# Patient Record
Sex: MALE | Race: WHITE | NOT HISPANIC OR LATINO | URBAN - METROPOLITAN AREA
[De-identification: names, ages, dates, MRNs, and addresses within clinical notes are randomized per-mention and may not be internally consistent; named-entity substitution may affect disease eponyms.]

---

## 2023-05-11 NOTE — PROGRESS NOTES
FAMILY MEDICINE  [facility]    Reason for visit:   Chief Complaint   Patient presents with    GI Problem    Genital Warts         SUBJECTIVE: Max Mejia is a 27 y.o. male  - presents as a new patient would like to discuss abdominal issues and concerns for wart    Max Mejia reports the last several months he has been dealing with abdominal cramping and bloating.  He reports that he has some issues with constipation and diarrhea recently.  He admits to not having good diet however he reports over the last year he has been making some changes that is cooking more at home and not eating out as much.  He does report cramping pain at his rectum as well has noticed blood in his stools.  He reports the blood seems to be strings of blood that are intermixed with his stool.  He does have some occasional blood when he wipes.  He is not had any issues previously.  He denies any family history of irritable bowel disease or colon cancer.    He also has concerns for work on his [place] area.  He reports it presented itself 2 weeks ago.  He reports it is nonpainful.  He denies any drainage swelling redness.  Denies any prior issues with genital warts.  We do have records of an HPV vaccine in 2015.  However we only have records of a single dose.  He thinks that he did complete his series but is unsure.  He will check his records        Review of Systems   All other systems reviewed and are negative.    HEALTH MAINTENANCE:   Health Maintenance   Topic Date Due    Hepatitis C Screening  Never done    Lipid Panel  Never done    HPV Vaccines (2 - Male 3-dose series) 11/13/2015    TETANUS VACCINE  10/21/2025     Health Maintenance Topics with due status: Not Due       Topic Last Completion Date    TETANUS VACCINE 10/21/2015    Influenza Vaccine 10/01/2018     Health Maintenance Due   Topic Date Due    Hepatitis C Screening  Never done    Lipid Panel  Never done    HIV Screening  Never done    HPV  "Vaccines (2 - Male 3-dose series) 11/13/2015    COVID-19 Vaccine (3 - Booster for Pfizer series) 05/17/2021       HISTORY:   Past Medical History:   Diagnosis Date    Nasal fracture        History reviewed. No pertinent surgical history.    Family History   Problem Relation Age of Onset    No Known Problems Mother     Diabetes Father     No Known Problems Sister     No Known Problems Sister     No Known Problems Maternal Grandmother     Cancer Maternal Grandfather         unknown    No Known Problems Paternal Grandmother     Liver cancer Paternal Grandfather        Social History     Tobacco Use    Smoking status: Every Day     Types: Vaping with nicotine, Cigarettes     Passive exposure: Current    Smokeless tobacco: Never   Substance Use Topics    Alcohol use: Yes     Comment: rare    Drug use: Yes     Types: Marijuana       Social History     Social History Narrative    Single in long-term relationship with female partner. From [place]. Served in the [id number]. . Cigarette, marijuana and vaping. Planning to stop smoking and vaping       ALLERGIES:   Review of patient's allergies indicates:   Allergen Reactions    Cat dander        MEDS:   No current outpatient medications on file as of 5/15/2023.     No current facility-administered medications on file as of 5/15/2023.       Vital signs:   Vitals:    05/15/23 1349   BP: 122/82   Pulse: 97   SpO2: 99%   Weight: 81.3 kg (179 lb 2 oz)   Height: 5' 8" (1.727 m)     Body mass index is 27.24 kg/m².    PHYSICAL EXAM:     Physical Exam  Vitals reviewed.   Constitutional:       General: He is not in acute distress.     Appearance: Normal appearance.   HENT:      Head: Normocephalic and atraumatic.      Right Ear: Tympanic membrane and ear canal normal.      Left Ear: Tympanic membrane and ear canal normal.      Nose: Nose normal.      Mouth/Throat:      Pharynx: Uvula midline.   Eyes:      General: No scleral icterus.     Conjunctiva/sclera: Conjunctivae normal. " "  Neck:      Thyroid: No thyromegaly.      Vascular: Normal carotid pulses. No carotid bruit or JVD.      Trachea: Trachea normal.   Cardiovascular:      Rate and Rhythm: Normal rate and regular rhythm.      Pulses: Normal pulses.      Heart sounds: Normal heart sounds. No murmur heard.    No friction rub. No gallop.   Pulmonary:      Effort: Pulmonary effort is normal.      Breath sounds: Normal breath sounds. No decreased breath sounds, wheezing, rhonchi or rales.   Abdominal:      General: Bowel sounds are normal. There is no distension.      Palpations: Abdomen is soft. There is no hepatomegaly or mass.      Tenderness: There is no abdominal tenderness. There is no guarding or rebound.   Musculoskeletal:      Cervical back: Neck supple.      Right lower leg: No edema.      Left lower leg: No edema.   Lymphadenopathy:      Cervical: No cervical adenopathy.   Skin:     General: Skin is warm.      Capillary Refill: Capillary refill takes less than 2 seconds.      Nails: There is no clubbing.          Neurological:      Mental Status: He is alert and oriented to person, place, and time.           PERTINENT RESULTS:   No visits with results within 1 Week(s) from this visit.   Latest known visit with results is:   Historical on 12/10/2008   Component Date Value Ref Range Status    Prothrombin Time 12/10/2008 10.4  9.0 - 12.5 sec Final    INR 12/10/2008 1.0  0.8 - 1.2 Final    Comment: ACCP Guideline for Coumadin usage recommends a "target range" of  2.0 - 3.0 for INR for all indicators except mechanical heart valves  and antiphospholipid syndromes which should use 2.5 - 3.5.  .      aPTT 12/10/2008 26.2  21.0 - 32.0 sec Final    Therapeutic range: 59-93 sec.    Color, UA 12/10/2008 Yellow  Yellow Final    Comment: If formed elements are not present in the microscopic  examination, they are NOT mentioned in the report.      Appearance, UA 12/10/2008 Clear  Clear Final    Specific Gravity, UA 12/10/2008 1.020  1.005 - " 1.030 Final    Leukocytes, UA 12/10/2008 NEG  Negative Final    Nitrite, UA 12/10/2008 NEG  Negative Final    pH, UA 12/10/2008 6.0  4.5 - 8.0 Final    Protein, UA 12/10/2008 NEG  Negative mg/dl Final    Glucose, UA 12/10/2008 NORM  Negative mg/dl Final    Ketones, UA 12/10/2008 NEG  Negative mg/dl Final    Bilirubin (UA) 12/10/2008 NEG  Negative Final    Urobilinogen, urine 12/10/2008 Normal  <4 mg/dL Final    Occult Blood UA 12/10/2008 NEG  Negative Final    WBC 12/10/2008 8.7  4.5 - 13.5 K/uL Final    RBC 12/10/2008 4.68  4.0 - 5.2 M/uL Final    Hemoglobin 12/10/2008 13.5  11.5 - 15.5 gm/dl Final    Hematocrit 12/10/2008 39.0  35.0 - 45.0 % Final    MCV 12/10/2008 83.4  77 - 95 fL Final    MCH 12/10/2008 28.9  25 - 33 pg Final    MCHC 12/10/2008 34.6  31 - 36 % Final    RDW 12/10/2008 12.9  11.5 - 14.5 % Final    Gran % 12/10/2008 45.8  33 - 59 % Final    Lymph % 12/10/2008 46.0  33 - 50 % Final    Mono % 12/10/2008 4.5  0.0 - 5.9 % Final    Eosinophil % 12/10/2008 3.3  0.0 - 3.7 % Final    Basophil % 12/10/2008 0.4  0 - 1.5 % Final    Gran # (ANC) 12/10/2008 4.0  1.5 - 8.0 K/uL Final    Lymph # 12/10/2008 4.0  1.5 - 6.8 K/uL Final    Mono # 12/10/2008 0.4  0.0 - 0.8 K/uL Final    Eos # 12/10/2008 0.3  0 - 0.5 K/uL Final    Baso # 12/10/2008 0.0  0.0 - 0.2 K/uL Final    Platelets 12/10/2008 272  150 - 350 K/uL Final    MPV 12/10/2008 8.6 (L)  9.2 - 12.9 fL Final     No results found for: CHOL  No results found for: HDL  No results found for: LDLCALC  No results found for: DLDL  No results found for: TRIG    f1 No results found for: CHOLHDL  No results found for: HIV1X2, IRQ81XVQZ  No results found for: HAV, HEPAIGM, HEPBIGM, HEPBCAB, HBEAG, HEPCAB    ASSESSMENT/PLAN:    1. Genital warts  Overview:  - recommend Dermatology evaluation for removal  - recommend complete HPV series if have not done previously. He reports that is suspects that he completed the series    Orders:  -     Ambulatory referral/consult to  Dermatology; Future; Expected date: 05/22/2023    2. Generalized abdominal pain  Overview:  - discussed concern for IBS vs IBD  - discussed concern for BRBPR  - recommend GI evaluation      3. Bright red blood per rectum  Overview:  - hemorrhoid vs diverticuli vs fissures vs IBD  - recommend GI evaluation    Orders:  -     Ambulatory referral/consult to Gastroenterology; Future; Expected date: 05/22/2023    4. Screening for metabolic disorder  -     Comprehensive Metabolic Panel; Future; Expected date: 05/15/2023    5. Encounter for lipid screening for cardiovascular disease  -     Lipid Panel; Future; Expected date: 05/15/2023    6. Screening, iron deficiency anemia  -     CBC Without Differential; Future; Expected date: 05/15/2023    7. Screening for diabetes mellitus (DM)  -     Hemoglobin A1C; Future; Expected date: 05/15/2023    8. Screening for thyroid disorder  -     TSH; Future; Expected date: 05/15/2023    9. Screen for STD (sexually transmitted disease)  -     C. trachomatis/N. gonorrhoeae by AMP DNA [facility]; Urine; Future; Expected date: 05/15/2023    10. Need for hepatitis C screening test  -     Hepatitis C Antibody; Future; Expected date: 05/15/2023    11. Screening for HIV (human immunodeficiency virus)  -     HIV 1/2 Ag/Ab (4th Gen); Future; Expected date: 05/15/2023    12. Need for vaccination against Streptococcus pneumoniae  -     Pneumococcal Conjugate Vaccine (20 Valent) (IM)          ORDERS:   Orders Placed This Encounter    C. trachomatis/N. gonorrhoeae by AMP DNA [facility]; Urine    Pneumococcal Conjugate Vaccine (20 Valent) (IM)    Comprehensive Metabolic Panel    CBC Without Differential    Hemoglobin A1C    Hepatitis C Antibody    HIV 1/2 Ag/Ab (4th Gen)    Lipid Panel    TSH    Ambulatory referral/consult to Dermatology    Ambulatory referral/consult to Gastroenterology       Vaccines recommended: covid1- booster, PCV 20    Follow-up in 1 year pending results or sooner if any  concerns.      This note is dictated using the M*Modal Fluency Direct word recognition program. There are word recognition mistakes that are occasionally missed on review.    Dr. Rosario Mock D.O.   [facility]

## 2023-05-15 ENCOUNTER — OFFICE VISIT (OUTPATIENT)
Dept: PRIMARY CARE CLINIC | Facility: CLINIC | Age: 28
End: 2023-05-15
Attending: FAMILY MEDICINE
Payer: COMMERCIAL

## 2023-05-15 VITALS
BODY MASS INDEX: 27.15 KG/M2 | HEIGHT: 68 IN | OXYGEN SATURATION: 99 % | HEART RATE: 97 BPM | SYSTOLIC BLOOD PRESSURE: 122 MMHG | WEIGHT: 179.13 LBS | DIASTOLIC BLOOD PRESSURE: 82 MMHG

## 2023-05-15 DIAGNOSIS — Z13.0 SCREENING, IRON DEFICIENCY ANEMIA: ICD-10-CM

## 2023-05-15 DIAGNOSIS — Z13.29 SCREENING FOR THYROID DISORDER: ICD-10-CM

## 2023-05-15 DIAGNOSIS — Z11.4 SCREENING FOR HIV (HUMAN IMMUNODEFICIENCY VIRUS): ICD-10-CM

## 2023-05-15 DIAGNOSIS — Z11.3 SCREEN FOR STD (SEXUALLY TRANSMITTED DISEASE): ICD-10-CM

## 2023-05-15 DIAGNOSIS — R10.84 GENERALIZED ABDOMINAL PAIN: ICD-10-CM

## 2023-05-15 DIAGNOSIS — Z23 NEED FOR VACCINATION AGAINST STREPTOCOCCUS PNEUMONIAE: ICD-10-CM

## 2023-05-15 DIAGNOSIS — Z13.228 SCREENING FOR METABOLIC DISORDER: ICD-10-CM

## 2023-05-15 DIAGNOSIS — A63.0 GENITAL WARTS: Primary | ICD-10-CM

## 2023-05-15 DIAGNOSIS — Z11.59 NEED FOR HEPATITIS C SCREENING TEST: ICD-10-CM

## 2023-05-15 DIAGNOSIS — Z13.220 ENCOUNTER FOR LIPID SCREENING FOR CARDIOVASCULAR DISEASE: ICD-10-CM

## 2023-05-15 DIAGNOSIS — Z13.1 SCREENING FOR DIABETES MELLITUS (DM): ICD-10-CM

## 2023-05-15 DIAGNOSIS — Z13.6 ENCOUNTER FOR LIPID SCREENING FOR CARDIOVASCULAR DISEASE: ICD-10-CM

## 2023-05-15 DIAGNOSIS — K62.5 BRIGHT RED BLOOD PER RECTUM: ICD-10-CM

## 2023-05-15 PROCEDURE — 1159F PR MEDICATION LIST DOCUMENTED IN MEDICAL RECORD: ICD-10-PCS | Mod: CPTII,S$GLB,, | Performed by: FAMILY MEDICINE

## 2023-05-15 PROCEDURE — 3008F PR BODY MASS INDEX (BMI) DOCUMENTED: ICD-10-PCS | Mod: CPTII,S$GLB,, | Performed by: FAMILY MEDICINE

## 2023-05-15 PROCEDURE — 90677 PNEUMOCOCCAL CONJUGATE VACCINE 20-VALENT: ICD-10-PCS | Mod: S$GLB,,, | Performed by: FAMILY MEDICINE

## 2023-05-15 PROCEDURE — 99999 PR PBB SHADOW E&M-NEW PATIENT-LVL IV: ICD-10-PCS | Mod: PBBFAC,,, | Performed by: FAMILY MEDICINE

## 2023-05-15 PROCEDURE — 90677 PCV20 VACCINE IM: CPT | Mod: S$GLB,,, | Performed by: FAMILY MEDICINE

## 2023-05-15 PROCEDURE — 99999 PR PBB SHADOW E&M-NEW PATIENT-LVL IV: CPT | Mod: PBBFAC,,, | Performed by: FAMILY MEDICINE

## 2023-05-15 PROCEDURE — 3074F PR MOST RECENT SYSTOLIC BLOOD PRESSURE < 130 MM HG: ICD-10-PCS | Mod: CPTII,S$GLB,, | Performed by: FAMILY MEDICINE

## 2023-05-15 PROCEDURE — 3079F PR MOST RECENT DIASTOLIC BLOOD PRESSURE 80-89 MM HG: ICD-10-PCS | Mod: CPTII,S$GLB,, | Performed by: FAMILY MEDICINE

## 2023-05-15 PROCEDURE — 1159F MED LIST DOCD IN RCRD: CPT | Mod: CPTII,S$GLB,, | Performed by: FAMILY MEDICINE

## 2023-05-15 PROCEDURE — 90471 IMMUNIZATION ADMIN: CPT | Mod: S$GLB,,, | Performed by: FAMILY MEDICINE

## 2023-05-15 PROCEDURE — 90471 PNEUMOCOCCAL CONJUGATE VACCINE 20-VALENT: ICD-10-PCS | Mod: S$GLB,,, | Performed by: FAMILY MEDICINE

## 2023-05-15 PROCEDURE — 99204 PR OFFICE/OUTPT VISIT, NEW, LEVL IV, 45-59 MIN: ICD-10-PCS | Mod: 25,S$GLB,, | Performed by: FAMILY MEDICINE

## 2023-05-15 PROCEDURE — 3074F SYST BP LT 130 MM HG: CPT | Mod: CPTII,S$GLB,, | Performed by: FAMILY MEDICINE

## 2023-05-15 PROCEDURE — 3079F DIAST BP 80-89 MM HG: CPT | Mod: CPTII,S$GLB,, | Performed by: FAMILY MEDICINE

## 2023-05-15 PROCEDURE — 3008F BODY MASS INDEX DOCD: CPT | Mod: CPTII,S$GLB,, | Performed by: FAMILY MEDICINE

## 2023-05-15 PROCEDURE — 99204 OFFICE O/P NEW MOD 45 MIN: CPT | Mod: 25,S$GLB,, | Performed by: FAMILY MEDICINE

## 2023-05-15 NOTE — PATIENT INSTRUCTIONS
Check if you have completed your HPV vaccine series. Our records show that you got at least 1 dose and there are 3 doses today.

## 2023-05-19 ENCOUNTER — LAB VISIT (OUTPATIENT)
Dept: LAB | Facility: HOSPITAL | Age: 28
End: 2023-05-19
Attending: FAMILY MEDICINE
Payer: COMMERCIAL

## 2023-05-19 DIAGNOSIS — Z13.0 SCREENING, IRON DEFICIENCY ANEMIA: ICD-10-CM

## 2023-05-19 DIAGNOSIS — Z11.4 SCREENING FOR HIV (HUMAN IMMUNODEFICIENCY VIRUS): ICD-10-CM

## 2023-05-19 DIAGNOSIS — Z13.29 SCREENING FOR THYROID DISORDER: ICD-10-CM

## 2023-05-19 DIAGNOSIS — Z13.220 ENCOUNTER FOR LIPID SCREENING FOR CARDIOVASCULAR DISEASE: ICD-10-CM

## 2023-05-19 DIAGNOSIS — Z13.228 SCREENING FOR METABOLIC DISORDER: ICD-10-CM

## 2023-05-19 DIAGNOSIS — Z13.1 SCREENING FOR DIABETES MELLITUS (DM): ICD-10-CM

## 2023-05-19 DIAGNOSIS — Z13.6 ENCOUNTER FOR LIPID SCREENING FOR CARDIOVASCULAR DISEASE: ICD-10-CM

## 2023-05-19 DIAGNOSIS — Z11.59 NEED FOR HEPATITIS C SCREENING TEST: ICD-10-CM

## 2023-05-19 LAB
ALBUMIN SERPL BCP-MCNC: 4.5 G/DL (ref 3.5–5.2)
ALP SERPL-CCNC: 58 U/L (ref 55–135)
ALT SERPL W/O P-5'-P-CCNC: 17 U/L (ref 10–44)
ANION GAP SERPL CALC-SCNC: 11 MMOL/L (ref 8–16)
AST SERPL-CCNC: 18 U/L (ref 10–40)
BILIRUB SERPL-MCNC: 0.4 MG/DL (ref 0.1–1)
BUN SERPL-MCNC: 15 MG/DL (ref 6–20)
CALCIUM SERPL-MCNC: 9.9 MG/DL (ref 8.7–10.5)
CHLORIDE SERPL-SCNC: 105 MMOL/L (ref 95–110)
CHOLEST SERPL-MCNC: 186 MG/DL (ref 120–199)
CHOLEST/HDLC SERPL: 4.4 {RATIO} (ref 2–5)
CO2 SERPL-SCNC: 25 MMOL/L (ref 23–29)
CREAT SERPL-MCNC: 0.9 MG/DL (ref 0.5–1.4)
ERYTHROCYTE [DISTWIDTH] IN BLOOD BY AUTOMATED COUNT: 12.4 % (ref 11.5–14.5)
EST. GFR  (NO RACE VARIABLE): >60 ML/MIN/1.73 M^2
ESTIMATED AVG GLUCOSE: 100 MG/DL (ref 68–131)
GLUCOSE SERPL-MCNC: 92 MG/DL (ref 70–110)
HBA1C MFR BLD: 5.1 % (ref 4–5.6)
HCT VFR BLD AUTO: 44.9 % (ref 40–54)
HCV AB SERPL QL IA: NORMAL
HDLC SERPL-MCNC: 42 MG/DL (ref 40–75)
HDLC SERPL: 22.6 % (ref 20–50)
HGB BLD-MCNC: 15.1 G/DL (ref 14–18)
HIV 1+2 AB+HIV1 P24 AG SERPL QL IA: NORMAL
LDLC SERPL CALC-MCNC: 128.6 MG/DL (ref 63–159)
MCH RBC QN AUTO: 29.7 PG (ref 27–31)
MCHC RBC AUTO-ENTMCNC: 33.6 G/DL (ref 32–36)
MCV RBC AUTO: 88 FL (ref 82–98)
NONHDLC SERPL-MCNC: 144 MG/DL
PLATELET # BLD AUTO: 253 K/UL (ref 150–450)
PMV BLD AUTO: 10.5 FL (ref 9.2–12.9)
POTASSIUM SERPL-SCNC: 4.4 MMOL/L (ref 3.5–5.1)
PROT SERPL-MCNC: 7.4 G/DL (ref 6–8.4)
RBC # BLD AUTO: 5.08 M/UL (ref 4.6–6.2)
SODIUM SERPL-SCNC: 141 MMOL/L (ref 136–145)
TRIGL SERPL-MCNC: 77 MG/DL (ref 30–150)
TSH SERPL DL<=0.005 MIU/L-ACNC: 0.43 UIU/ML (ref 0.4–4)
WBC # BLD AUTO: 7.64 K/UL (ref 3.9–12.7)

## 2023-05-19 PROCEDURE — 87389 HIV-1 AG W/HIV-1&-2 AB AG IA: CPT | Performed by: FAMILY MEDICINE

## 2023-05-19 PROCEDURE — 86803 HEPATITIS C AB TEST: CPT | Performed by: FAMILY MEDICINE

## 2023-05-19 PROCEDURE — 83036 HEMOGLOBIN GLYCOSYLATED A1C: CPT | Performed by: FAMILY MEDICINE

## 2023-05-19 PROCEDURE — 85027 COMPLETE CBC AUTOMATED: CPT | Performed by: FAMILY MEDICINE

## 2023-05-19 PROCEDURE — 80053 COMPREHEN METABOLIC PANEL: CPT | Performed by: FAMILY MEDICINE

## 2023-05-19 PROCEDURE — 84443 ASSAY THYROID STIM HORMONE: CPT | Performed by: FAMILY MEDICINE

## 2023-05-19 PROCEDURE — 36415 COLL VENOUS BLD VENIPUNCTURE: CPT | Mod: PN | Performed by: FAMILY MEDICINE

## 2023-05-19 PROCEDURE — 80061 LIPID PANEL: CPT | Performed by: FAMILY MEDICINE

## 2023-06-14 ENCOUNTER — OFFICE VISIT (OUTPATIENT)
Dept: SURGERY | Facility: CLINIC | Age: 28
End: 2023-06-14
Payer: COMMERCIAL

## 2023-06-14 VITALS
HEART RATE: 99 BPM | DIASTOLIC BLOOD PRESSURE: 78 MMHG | SYSTOLIC BLOOD PRESSURE: 123 MMHG | BODY MASS INDEX: 26.83 KG/M2 | HEIGHT: 68 IN | WEIGHT: 177 LBS

## 2023-06-14 DIAGNOSIS — K62.5 BRIGHT RED BLOOD PER RECTUM: ICD-10-CM

## 2023-06-14 PROCEDURE — 1159F PR MEDICATION LIST DOCUMENTED IN MEDICAL RECORD: ICD-10-PCS | Mod: CPTII,S$GLB,, | Performed by: NURSE PRACTITIONER

## 2023-06-14 PROCEDURE — 99203 OFFICE O/P NEW LOW 30 MIN: CPT | Mod: S$GLB,,, | Performed by: NURSE PRACTITIONER

## 2023-06-14 PROCEDURE — 3078F DIAST BP <80 MM HG: CPT | Mod: CPTII,S$GLB,, | Performed by: NURSE PRACTITIONER

## 2023-06-14 PROCEDURE — 1160F PR REVIEW ALL MEDS BY PRESCRIBER/CLIN PHARMACIST DOCUMENTED: ICD-10-PCS | Mod: CPTII,S$GLB,, | Performed by: NURSE PRACTITIONER

## 2023-06-14 PROCEDURE — 1160F RVW MEDS BY RX/DR IN RCRD: CPT | Mod: CPTII,S$GLB,, | Performed by: NURSE PRACTITIONER

## 2023-06-14 PROCEDURE — 3044F PR MOST RECENT HEMOGLOBIN A1C LEVEL <7.0%: ICD-10-PCS | Mod: CPTII,S$GLB,, | Performed by: NURSE PRACTITIONER

## 2023-06-14 PROCEDURE — 3078F PR MOST RECENT DIASTOLIC BLOOD PRESSURE < 80 MM HG: ICD-10-PCS | Mod: CPTII,S$GLB,, | Performed by: NURSE PRACTITIONER

## 2023-06-14 PROCEDURE — 99999 PR PBB SHADOW E&M-EST. PATIENT-LVL IV: CPT | Mod: PBBFAC,,, | Performed by: NURSE PRACTITIONER

## 2023-06-14 PROCEDURE — 99999 PR PBB SHADOW E&M-EST. PATIENT-LVL IV: ICD-10-PCS | Mod: PBBFAC,,, | Performed by: NURSE PRACTITIONER

## 2023-06-14 PROCEDURE — 1159F MED LIST DOCD IN RCRD: CPT | Mod: CPTII,S$GLB,, | Performed by: NURSE PRACTITIONER

## 2023-06-14 PROCEDURE — 3044F HG A1C LEVEL LT 7.0%: CPT | Mod: CPTII,S$GLB,, | Performed by: NURSE PRACTITIONER

## 2023-06-14 PROCEDURE — 3074F PR MOST RECENT SYSTOLIC BLOOD PRESSURE < 130 MM HG: ICD-10-PCS | Mod: CPTII,S$GLB,, | Performed by: NURSE PRACTITIONER

## 2023-06-14 PROCEDURE — 99203 PR OFFICE/OUTPT VISIT, NEW, LEVL III, 30-44 MIN: ICD-10-PCS | Mod: S$GLB,,, | Performed by: NURSE PRACTITIONER

## 2023-06-14 PROCEDURE — 3008F BODY MASS INDEX DOCD: CPT | Mod: CPTII,S$GLB,, | Performed by: NURSE PRACTITIONER

## 2023-06-14 PROCEDURE — 3074F SYST BP LT 130 MM HG: CPT | Mod: CPTII,S$GLB,, | Performed by: NURSE PRACTITIONER

## 2023-06-14 PROCEDURE — 3008F PR BODY MASS INDEX (BMI) DOCUMENTED: ICD-10-PCS | Mod: CPTII,S$GLB,, | Performed by: NURSE PRACTITIONER

## 2023-06-14 RX ORDER — HYDROCORTISONE ACETATE 25 MG/1
25 SUPPOSITORY RECTAL 2 TIMES DAILY
Qty: 24 SUPPOSITORY | Refills: 1 | Status: SHIPPED | OUTPATIENT
Start: 2023-06-14 | End: 2023-07-08

## 2023-06-14 RX ORDER — HYDROCORTISONE 25 MG/G
CREAM TOPICAL 2 TIMES DAILY
Qty: 28 G | Refills: 2 | Status: SHIPPED | OUTPATIENT
Start: 2023-06-14

## 2023-06-14 NOTE — PROGRESS NOTES
"CRS Office Visit History and Physical    Referring Md:   Rosario Mock, Do  [address] [facility] [place] [address]    SUBJECTIVE:     Chief Complaint: blood in stools    History of Present Illness:  The patient is new patient to this practice.   Course is as follows:  Patient is a 27 y.o. male presents with painless blood in stools only with bowel movements  Symptoms have been present for 4 years. Occurs every few months. Noted in stools.  Also reports an intermittent sharp generalized lower abdominal pain. Worse at night. No relieving factors "I just go to bed"  Has tried nothing.  Associated bleeding: yes  Previous anorectal procedures: No  confirms prolonged time (10-30 mins) on toilet with bowel movements.  is not currently taking fiber supplement or stool softener. Daily bm formed < loose  Blood thinners: No    Last Colonoscopy: none  Family history of colorectal cancer or IBD: none.    Review of patient's allergies indicates:   Allergen Reactions    Cat dander        Past Medical History:   Diagnosis Date    Nasal fracture      No past surgical history on file.  Family History   Problem Relation Age of Onset    No Known Problems Mother     Diabetes Father     No Known Problems Sister     No Known Problems Sister     No Known Problems Maternal Grandmother     Cancer Maternal Grandfather         unknown    No Known Problems Paternal Grandmother     Liver cancer Paternal Grandfather      Social History     Tobacco Use    Smoking status: Every Day     Types: Vaping with nicotine, Cigarettes     Passive exposure: Current    Smokeless tobacco: Never   Substance Use Topics    Alcohol use: Yes     Comment: rare    Drug use: Yes     Types: Marijuana        Review of Systems:  Review of Systems   Constitutional:  Negative for weight loss.   Gastrointestinal:  Positive for abdominal pain, blood in stool, diarrhea and heartburn.   Musculoskeletal:  Positive for joint pain. " "    OBJECTIVE:     Vital Signs (Most Recent)  Blood Pressure 123/78 (BP Location: Left arm, Patient Position: Sitting, BP Method: Large (Automatic))   Pulse 99   Height 5' 8" (1.727 m)   Weight 80.3 kg (177 lb)   Body Mass Index 26.91 kg/m²     Physical Exam:  General: White male in no distress   Neuro: Alert and oriented to person, place, and time.  Moves all extremities.     HEENT: No icterus.  Trachea midline  Respiratory: Respirations are even and unlabored, no cough or audible wheezing  Skin: Warm dry and intact, No visible rashes, no jaundice    Labs reviewed today:  Lab Results   Component Value Date    WBC 7.64 05/19/2023    HGB 15.1 05/19/2023    HCT 44.9 05/19/2023     05/19/2023    CHOL 186 05/19/2023    TRIG 77 05/19/2023    HDL 42 05/19/2023    ALT 17 05/19/2023    AST 18 05/19/2023     05/19/2023    K 4.4 05/19/2023     05/19/2023    CREATININE 0.9 05/19/2023    BUN 15 05/19/2023    CO2 25 05/19/2023    TSH 0.430 05/19/2023    INR 1.0 12/10/2008    HGBA1C 5.1 05/19/2023       Imaging reviewed today:  none    Endoscopy reviewed today:  none    Anorectal Exam:  Deferred per pt request    ASSESSMENT/PLAN:     Diagnoses and all orders for this visit:    Bright red blood per rectum  -     Ambulatory referral/consult to Gastroenterology  -     hydrocortisone (ANUSOL-HC) 25 mg suppository; Place 1 suppository (25 mg total) rectally 2 (two) times daily. for 24 days  -     hydrocortisone (ANUSOL-HC) 2.5 % rectal cream; Place rectally 2 (two) times daily.        The patient was instructed to:  Anusol bid for 12 days  Increase water intake to at least 8-10 glasses of water per day.  Take a daily fiber supplement (Konsyl, Benefiber, Metamucil) and increase dietary intake to 20-30 grams/day.  Avoid straining or spending >5min/event with bowel movements.  Follow-up in clinic prn. If no improvement will order CT/Colonoscopy at that time.      Allyson A. Nicolas, FNP-C  Colon and Rectal " Surgery

## 2023-06-14 NOTE — PATIENT INSTRUCTIONS
Daily fiber supplement like citrucel, fibercon, metamucil.  Start low/slow and work your way up to the full daily dose.  Water intake > 64 oz/day.   No sitting/straining > 5 mins with bowel movements.  Anusol cream or suppositories rectally 2x/day for 12 days

## 2023-08-14 PROBLEM — K62.5 BRIGHT RED BLOOD PER RECTUM: Status: RESOLVED | Noted: 2023-05-15 | Resolved: 2023-08-14
